# Patient Record
Sex: MALE | Race: WHITE | NOT HISPANIC OR LATINO | Employment: OTHER | URBAN - METROPOLITAN AREA
[De-identification: names, ages, dates, MRNs, and addresses within clinical notes are randomized per-mention and may not be internally consistent; named-entity substitution may affect disease eponyms.]

---

## 2018-04-05 ENCOUNTER — TRANSCRIBE ORDERS (OUTPATIENT)
Dept: ADMINISTRATIVE | Facility: HOSPITAL | Age: 53
End: 2018-04-05

## 2018-04-05 DIAGNOSIS — R60.0 EDEMA, LOWER EXTREMITY: ICD-10-CM

## 2018-04-05 DIAGNOSIS — R20.9 COLD FEET: Primary | ICD-10-CM

## 2018-04-11 ENCOUNTER — HOSPITAL ENCOUNTER (OUTPATIENT)
Dept: CARDIOLOGY | Facility: HOSPITAL | Age: 53
Discharge: HOME | End: 2018-04-11
Attending: FAMILY MEDICINE | Admitting: FAMILY MEDICINE

## 2018-04-11 DIAGNOSIS — R20.9 COLD FEET: ICD-10-CM

## 2018-04-11 DIAGNOSIS — R60.0 EDEMA, LOWER EXTREMITY: ICD-10-CM

## 2018-04-11 LAB
BH CV LOWER ARTERIAL LEFT ABI RATIO: 1.1
BH CV LOWER ARTERIAL LEFT DORSALIS PEDIS SYS MAX: 171 MMHG
BH CV LOWER ARTERIAL LEFT GREAT TOE SYS MAX: 156 MMHG
BH CV LOWER ARTERIAL LEFT POST EX ABI RATIO: 1.3
BH CV LOWER ARTERIAL LEFT POST TIBIAL SYS MAX: 168 MMHG
BH CV LOWER ARTERIAL LEFT TBI RATIO: 1
BH CV LOWER ARTERIAL RIGHT ABI RATIO: 1
BH CV LOWER ARTERIAL RIGHT DORSALIS PEDIS SYS MAX: 152 MMHG
BH CV LOWER ARTERIAL RIGHT GREAT TOE SYS MAX: 155 MMHG
BH CV LOWER ARTERIAL RIGHT POST EX ABI RATIO: 1.3
BH CV LOWER ARTERIAL RIGHT POST TIBIAL SYS MAX: 163 MMHG
BH CV LOWER ARTERIAL RIGHT TBI RATIO: 1
UPPER ARTERIAL LEFT ARM BRACHIAL SYS MAX: 149 MMHG
UPPER ARTERIAL RIGHT ARM BRACHIAL SYS MAX: 152 MMHG

## 2018-04-11 PROCEDURE — 93924 LWR XTR VASC STDY BILAT: CPT

## 2018-04-25 ENCOUNTER — TRANSCRIBE ORDERS (OUTPATIENT)
Dept: ADMINISTRATIVE | Facility: HOSPITAL | Age: 53
End: 2018-04-25

## 2018-04-25 DIAGNOSIS — R20.9 COLD FEET: ICD-10-CM

## 2018-04-25 DIAGNOSIS — R60.9 EDEMA, UNSPECIFIED TYPE: Primary | ICD-10-CM

## 2018-05-08 ENCOUNTER — HOSPITAL ENCOUNTER (OUTPATIENT)
Dept: INFUSION THERAPY | Facility: HOSPITAL | Age: 53
Discharge: HOME | End: 2018-05-08
Attending: FAMILY MEDICINE | Admitting: PSYCHIATRY & NEUROLOGY

## 2018-05-08 DIAGNOSIS — R20.9 COLD FEET: ICD-10-CM

## 2018-05-08 DIAGNOSIS — R60.9 EDEMA, UNSPECIFIED TYPE: ICD-10-CM

## 2018-05-08 PROCEDURE — 95908 NRV CNDJ TST 3-4 STUDIES: CPT | Performed by: PSYCHIATRY & NEUROLOGY

## 2018-05-08 PROCEDURE — 95886 MUSC TEST DONE W/N TEST COMP: CPT

## 2018-05-08 PROCEDURE — 95908 NRV CNDJ TST 3-4 STUDIES: CPT

## 2018-05-08 PROCEDURE — 95886 MUSC TEST DONE W/N TEST COMP: CPT | Performed by: PSYCHIATRY & NEUROLOGY

## 2018-05-08 NOTE — PROCEDURES
EMG REPORT    Indication: Pain and numbness of both lower extremities    Findings: Left peroneal motor study showed no reliable response with either proximal or distal stimulation.  Bilateral tibial motor study showed no reliable response.    Concentric needle EMG of bilateral vastus lateralis, vastus medialis, anterior tibialis, peroneus, gastrocnemius muscles showed no abnormality.    Impression: Nerve conduction studies showed no reliable response.  This is most consistent with this severe diffuse polyneuropathy.  Needle EMG failed to show evidence of superimposed lumbosacral radiculopathy    Thank you for sending this patient for further evaluation.  Wong Horan M.D.

## 2018-08-07 ENCOUNTER — OFFICE VISIT (OUTPATIENT)
Dept: NEUROLOGY | Facility: CLINIC | Age: 53
End: 2018-08-07

## 2018-08-07 ENCOUNTER — LAB (OUTPATIENT)
Dept: LAB | Facility: HOSPITAL | Age: 53
End: 2018-08-07

## 2018-08-07 ENCOUNTER — HOSPITAL ENCOUNTER (OUTPATIENT)
Dept: CARDIOLOGY | Facility: HOSPITAL | Age: 53
Discharge: HOME | End: 2018-08-07
Attending: PSYCHIATRY & NEUROLOGY | Admitting: PSYCHIATRY & NEUROLOGY

## 2018-08-07 VITALS
WEIGHT: 232 LBS | OXYGEN SATURATION: 96 % | SYSTOLIC BLOOD PRESSURE: 140 MMHG | BODY MASS INDEX: 33.21 KG/M2 | HEART RATE: 92 BPM | HEIGHT: 70 IN | DIASTOLIC BLOOD PRESSURE: 80 MMHG

## 2018-08-07 DIAGNOSIS — R60.0 EDEMA OF BOTH LEGS: ICD-10-CM

## 2018-08-07 DIAGNOSIS — G60.9 IDIOPATHIC PERIPHERAL NEUROPATHY: Primary | ICD-10-CM

## 2018-08-07 DIAGNOSIS — R60.0 EDEMA OF BOTH LEGS: Primary | ICD-10-CM

## 2018-08-07 DIAGNOSIS — M48.062 SPINAL STENOSIS OF LUMBAR REGION WITH NEUROGENIC CLAUDICATION: ICD-10-CM

## 2018-08-07 DIAGNOSIS — G60.9 IDIOPATHIC PERIPHERAL NEUROPATHY: ICD-10-CM

## 2018-08-07 PROBLEM — M79.2 NEUROPATHIC PAIN OF FOOT, LEFT: Status: ACTIVE | Noted: 2018-05-03

## 2018-08-07 PROBLEM — M96.1 FAILED BACK SYNDROME OF LUMBAR SPINE: Status: ACTIVE | Noted: 2018-08-07

## 2018-08-07 PROBLEM — IMO0002 UNCONTROLLED TYPE 2 DIABETES MELLITUS WITH COMPLICATION: Status: ACTIVE | Noted: 2018-05-03

## 2018-08-07 PROBLEM — M51.06 LUMBAR DISC DISORDER WITH MYELOPATHY: Status: ACTIVE | Noted: 2018-08-07

## 2018-08-07 PROBLEM — M54.40 LOW BACK PAIN WITH SCIATICA: Status: ACTIVE | Noted: 2018-03-30

## 2018-08-07 PROBLEM — I10 ESSENTIAL HYPERTENSION: Status: ACTIVE | Noted: 2018-05-03

## 2018-08-07 PROBLEM — M16.10 HIP ARTHRITIS: Status: ACTIVE | Noted: 2018-04-06

## 2018-08-07 LAB
BH CV LOWER VASCULAR LEFT COMMON FEMORAL AUGMENT: NORMAL
BH CV LOWER VASCULAR LEFT COMMON FEMORAL COMPETENT: NORMAL
BH CV LOWER VASCULAR LEFT COMMON FEMORAL COMPRESS: NORMAL
BH CV LOWER VASCULAR LEFT COMMON FEMORAL PHASIC: NORMAL
BH CV LOWER VASCULAR LEFT COMMON FEMORAL SPONT: NORMAL
BH CV LOWER VASCULAR LEFT DISTAL FEMORAL COMPRESS: NORMAL
BH CV LOWER VASCULAR LEFT GASTRONEMIUS COMPRESS: NORMAL
BH CV LOWER VASCULAR LEFT GREATER SAPH AK COMPRESS: NORMAL
BH CV LOWER VASCULAR LEFT GREATER SAPH BK COMPRESS: NORMAL
BH CV LOWER VASCULAR LEFT MID FEMORAL AUGMENT: NORMAL
BH CV LOWER VASCULAR LEFT MID FEMORAL COMPETENT: NORMAL
BH CV LOWER VASCULAR LEFT MID FEMORAL COMPRESS: NORMAL
BH CV LOWER VASCULAR LEFT MID FEMORAL PHASIC: NORMAL
BH CV LOWER VASCULAR LEFT MID FEMORAL SPONT: NORMAL
BH CV LOWER VASCULAR LEFT PERONEAL COMPRESS: NORMAL
BH CV LOWER VASCULAR LEFT POPLITEAL AUGMENT: NORMAL
BH CV LOWER VASCULAR LEFT POPLITEAL COMPETENT: NORMAL
BH CV LOWER VASCULAR LEFT POPLITEAL COMPRESS: NORMAL
BH CV LOWER VASCULAR LEFT POPLITEAL PHASIC: NORMAL
BH CV LOWER VASCULAR LEFT POPLITEAL SPONT: NORMAL
BH CV LOWER VASCULAR LEFT POSTERIOR TIBIAL COMPRESS: NORMAL
BH CV LOWER VASCULAR LEFT PROXIMAL FEMORAL COMPRESS: NORMAL
BH CV LOWER VASCULAR LEFT SAPHENOFEMORAL JUNCTION AUGMENT: NORMAL
BH CV LOWER VASCULAR LEFT SAPHENOFEMORAL JUNCTION COMPETENT: NORMAL
BH CV LOWER VASCULAR LEFT SAPHENOFEMORAL JUNCTION COMPRESS: NORMAL
BH CV LOWER VASCULAR LEFT SAPHENOFEMORAL JUNCTION PHASIC: NORMAL
BH CV LOWER VASCULAR LEFT SAPHENOFEMORAL JUNCTION SPONT: NORMAL
BH CV LOWER VASCULAR RIGHT COMMON FEMORAL AUGMENT: NORMAL
BH CV LOWER VASCULAR RIGHT COMMON FEMORAL COMPETENT: NORMAL
BH CV LOWER VASCULAR RIGHT COMMON FEMORAL COMPRESS: NORMAL
BH CV LOWER VASCULAR RIGHT COMMON FEMORAL PHASIC: NORMAL
BH CV LOWER VASCULAR RIGHT COMMON FEMORAL SPONT: NORMAL
BH CV LOWER VASCULAR RIGHT DISTAL FEMORAL COMPRESS: NORMAL
BH CV LOWER VASCULAR RIGHT GASTRONEMIUS COMPRESS: NORMAL
BH CV LOWER VASCULAR RIGHT GREATER SAPH AK COMPRESS: NORMAL
BH CV LOWER VASCULAR RIGHT GREATER SAPH BK COMPRESS: NORMAL
BH CV LOWER VASCULAR RIGHT MID FEMORAL AUGMENT: NORMAL
BH CV LOWER VASCULAR RIGHT MID FEMORAL COMPETENT: NORMAL
BH CV LOWER VASCULAR RIGHT MID FEMORAL COMPRESS: NORMAL
BH CV LOWER VASCULAR RIGHT MID FEMORAL PHASIC: NORMAL
BH CV LOWER VASCULAR RIGHT MID FEMORAL SPONT: NORMAL
BH CV LOWER VASCULAR RIGHT PERONEAL COMPRESS: NORMAL
BH CV LOWER VASCULAR RIGHT POPLITEAL AUGMENT: NORMAL
BH CV LOWER VASCULAR RIGHT POPLITEAL COMPETENT: NORMAL
BH CV LOWER VASCULAR RIGHT POPLITEAL COMPRESS: NORMAL
BH CV LOWER VASCULAR RIGHT POPLITEAL PHASIC: NORMAL
BH CV LOWER VASCULAR RIGHT POPLITEAL SPONT: NORMAL
BH CV LOWER VASCULAR RIGHT POSTERIOR TIBIAL COMPRESS: NORMAL
BH CV LOWER VASCULAR RIGHT PROXIMAL FEMORAL COMPRESS: NORMAL
BH CV LOWER VASCULAR RIGHT SAPHENOFEMORAL JUNCTION AUGMENT: NORMAL
BH CV LOWER VASCULAR RIGHT SAPHENOFEMORAL JUNCTION COMPETENT: NORMAL
BH CV LOWER VASCULAR RIGHT SAPHENOFEMORAL JUNCTION COMPRESS: NORMAL
BH CV LOWER VASCULAR RIGHT SAPHENOFEMORAL JUNCTION PHASIC: NORMAL
BH CV LOWER VASCULAR RIGHT SAPHENOFEMORAL JUNCTION SPONT: NORMAL
ERYTHROCYTE [SEDIMENTATION RATE] IN BLOOD: 44 MM/HR (ref 0–20)
FOLATE SERPL-MCNC: 11.1 NG/ML (ref 4.78–24.2)
T4 FREE SERPL-MCNC: 1.25 NG/DL (ref 0.93–1.7)
TSH SERPL DL<=0.05 MIU/L-ACNC: 1.65 MIU/ML (ref 0.27–4.2)
VIT B12 BLD-MCNC: 305 PG/ML (ref 211–946)

## 2018-08-07 PROCEDURE — 86334 IMMUNOFIX E-PHORESIS SERUM: CPT

## 2018-08-07 PROCEDURE — 86592 SYPHILIS TEST NON-TREP QUAL: CPT | Performed by: PSYCHIATRY & NEUROLOGY

## 2018-08-07 PROCEDURE — 85652 RBC SED RATE AUTOMATED: CPT | Performed by: PSYCHIATRY & NEUROLOGY

## 2018-08-07 PROCEDURE — 84439 ASSAY OF FREE THYROXINE: CPT | Performed by: PSYCHIATRY & NEUROLOGY

## 2018-08-07 PROCEDURE — 82175 ASSAY OF ARSENIC: CPT | Performed by: PSYCHIATRY & NEUROLOGY

## 2018-08-07 PROCEDURE — 36415 COLL VENOUS BLD VENIPUNCTURE: CPT | Performed by: PSYCHIATRY & NEUROLOGY

## 2018-08-07 PROCEDURE — 84155 ASSAY OF PROTEIN SERUM: CPT | Performed by: PSYCHIATRY & NEUROLOGY

## 2018-08-07 PROCEDURE — 83655 ASSAY OF LEAD: CPT | Performed by: PSYCHIATRY & NEUROLOGY

## 2018-08-07 PROCEDURE — 82607 VITAMIN B-12: CPT | Performed by: PSYCHIATRY & NEUROLOGY

## 2018-08-07 PROCEDURE — 82746 ASSAY OF FOLIC ACID SERUM: CPT | Performed by: PSYCHIATRY & NEUROLOGY

## 2018-08-07 PROCEDURE — 93970 EXTREMITY STUDY: CPT

## 2018-08-07 PROCEDURE — 84165 PROTEIN E-PHORESIS SERUM: CPT | Performed by: PSYCHIATRY & NEUROLOGY

## 2018-08-07 PROCEDURE — 99215 OFFICE O/P EST HI 40 MIN: CPT | Performed by: PSYCHIATRY & NEUROLOGY

## 2018-08-07 PROCEDURE — 83825 ASSAY OF MERCURY: CPT | Performed by: PSYCHIATRY & NEUROLOGY

## 2018-08-07 PROCEDURE — 84443 ASSAY THYROID STIM HORMONE: CPT | Performed by: PSYCHIATRY & NEUROLOGY

## 2018-08-07 PROCEDURE — 82784 ASSAY IGA/IGD/IGG/IGM EACH: CPT

## 2018-08-07 PROCEDURE — 82595 ASSAY OF CRYOGLOBULIN: CPT

## 2018-08-07 RX ORDER — CYCLOBENZAPRINE HCL 10 MG
10 TABLET ORAL
COMMUNITY
Start: 2013-08-12

## 2018-08-07 RX ORDER — SILDENAFIL 100 MG/1
TABLET, FILM COATED ORAL
COMMUNITY
Start: 2017-12-26 | End: 2021-01-20

## 2018-08-07 RX ORDER — INSULIN GLARGINE 100 [IU]/ML
3 INJECTION, SOLUTION SUBCUTANEOUS DAILY
Refills: 11 | COMMUNITY
Start: 2018-05-03

## 2018-08-07 RX ORDER — PEN NEEDLE, DIABETIC 31 GX5/16"
NEEDLE, DISPOSABLE MISCELLANEOUS
Refills: 11 | COMMUNITY
Start: 2018-05-03

## 2018-08-07 RX ORDER — GLIMEPIRIDE 4 MG/1
8 TABLET ORAL
COMMUNITY
Start: 2018-06-28 | End: 2019-06-28

## 2018-08-07 RX ORDER — LANCETS
EACH MISCELLANEOUS
COMMUNITY
Start: 2018-06-29

## 2018-08-07 RX ORDER — LISINOPRIL 5 MG/1
5 TABLET ORAL DAILY
Refills: 0 | COMMUNITY
Start: 2018-07-18

## 2018-08-07 RX ORDER — METFORMIN HYDROCHLORIDE 500 MG/1
1000 TABLET, EXTENDED RELEASE ORAL
COMMUNITY
Start: 2018-06-28

## 2018-08-07 RX ORDER — MELOXICAM 15 MG/1
TABLET ORAL
COMMUNITY
Start: 2018-01-23

## 2018-08-07 RX ORDER — METHADONE HYDROCHLORIDE 10 MG/1
10 TABLET ORAL 3 TIMES DAILY
Refills: 0 | COMMUNITY
Start: 2018-07-27

## 2018-08-07 RX ORDER — LIDOCAINE 50 MG/G
1 PATCH TOPICAL
COMMUNITY
Start: 2018-01-23

## 2018-08-07 RX ORDER — ZOLPIDEM TARTRATE 12.5 MG/1
12.5 TABLET, FILM COATED, EXTENDED RELEASE ORAL NIGHTLY
Refills: 5 | COMMUNITY
Start: 2018-07-18

## 2018-08-07 RX ORDER — GABAPENTIN 600 MG/1
600 TABLET ORAL 4 TIMES DAILY
Refills: 2 | COMMUNITY
Start: 2018-07-18

## 2018-08-07 RX ORDER — SERTRALINE HYDROCHLORIDE 100 MG/1
100 TABLET, FILM COATED ORAL DAILY
COMMUNITY
Start: 2013-08-12 | End: 2018-08-30 | Stop reason: SDUPTHER

## 2018-08-07 RX ORDER — SILODOSIN 8 MG/1
8 CAPSULE ORAL NIGHTLY
Refills: 2 | COMMUNITY
Start: 2018-07-30

## 2018-08-07 RX ORDER — DOCUSATE SODIUM 100 MG/1
100 CAPSULE, LIQUID FILLED ORAL 2 TIMES DAILY
COMMUNITY
Start: 2018-01-23

## 2018-08-07 NOTE — PROGRESS NOTES
CC: Numbness and pain left much greater than right lower extremity    HPI:  TONY Kruse is a  53 y.o.  right-handed white male who was sent for a neurologic consultation by Dr. Kendall Stockton with history of diabetes for about 13 years untreated until last year.  He has numbness and pain in the legs left much worse than right.  He describes the pain is being in the lower leg and foot and it is of burning stabbing and throbbing quality.  He states it is excruciating.  He has hypersensitivity of the foot and leg in that just the covers while be very painful.  In the past week he has developed notable swelling in the left leg but there has been apparently some swelling over a few weeks in both legs.  He was placed on gabapentin between 6 and 8 months ago up to 4 tablets daily without much effect he states.  He had been seen by pain management Dr. Sanchez now Dr. Matthew Pickard.  He is on a lidocaine 5% patch, meloxicam and gabapentin.  He takes to prior mate for seizures also and cyclobenzaprine for muscle spasms.  He is on sertraline for depression.    He has history of an extensive lumbar decompression in 2004 after a fall with multiple spinal fractures.  He had partial paralysis from the waist down acutely.  He has been reevaluated and the L5/S1 level which was not confused now has significant spondylolisthesis with cauda equina compression while weightbearing.  He also has severe osteoarthritis in both hips.  He states that he has been told by surgeons that they do not wish to operate on him unless his hemoglobin A1c drops to 7.    He states with this fall he had seizures.  He was seen before by Dr. Rivera who used a tuning fork to trigger a seizure.  He has not had recurrent seizures being on Topamax.    His initial hemoglobin A1c was 12.  Being treated for a few months it is now 9.    An EMG was performed by Dr. Horan showing absent motor conductions in the left peroneal and both tibial motor nerves with  negative needle exam in both legs.  No foot muscles were sampled.    Past Medical History:   Diagnosis Date   • Anxiety    • Degenerative joint disease (DJD) of lumbar spine    • Depression    • Diabetes mellitus (CMS/HCC)    • Seizures (CMS/HCC)          Past Surgical History:   Procedure Laterality Date   • BACK SURGERY             Current Outpatient Prescriptions:   •  cyclobenzaprine (FLEXERIL) 10 MG tablet, Take 1 tablet by mouth 2 (two) times a day., Disp: , Rfl:   •  cyclobenzaprine (FLEXERIL) 10 MG tablet, Take 10 mg by mouth., Disp: , Rfl:   •  docusate sodium (COLACE) 100 MG capsule, Take 1 capsule by mouth daily., Disp: , Rfl:   •  docusate sodium (DOK) 100 MG capsule, Take 100 mg by mouth 2 (Two) Times a Day., Disp: , Rfl:   •  glimepiride (AMARYL) 4 MG tablet, Take 8 mg by mouth., Disp: , Rfl:   •  glucose blood test strip, 1 strip by Other route., Disp: , Rfl:   •  lidocaine (LIDODERM) 5 %, Place 1 patch on the skin as directed by provider., Disp: , Rfl:   •  lisinopril (PRINIVIL,ZESTRIL) 5 MG tablet, Take 5 mg by mouth Daily., Disp: , Rfl: 0  •  meloxicam (MOBIC) 15 MG tablet, TK 1 T PO D, Disp: , Rfl:   •  metFORMIN ER (GLUCOPHAGE-XR) 500 MG 24 hr tablet, Take 1,000 mg by mouth., Disp: , Rfl:   •  mupirocin (BACTROBAN) 2 % ointment, Using Q-tips apply a small amount into each nostril BID x 5 day.  Start 5 days prior to surgery., Disp: , Rfl:   •  sertraline (ZOLOFT) 100 MG tablet, Take 2 tablets by mouth daily., Disp: , Rfl:   •  sertraline (ZOLOFT) 100 MG tablet, Take 100 mg by mouth Daily., Disp: , Rfl:   •  sildenafil (VIAGRA) 100 MG tablet, TK 1 T PO HS, Disp: , Rfl:   •  topiramate (TOPAMAX) 100 MG tablet, Take 1 tablet by mouth 2 (two) times a day., Disp: , Rfl:   •  B-D ULTRAFINE III SHORT PEN 31G X 8 MM misc, U UTD, Disp: , Rfl: 11  •  gabapentin (NEURONTIN) 600 MG tablet, Take 600 mg by mouth 4 (Four) Times a Day., Disp: , Rfl: 2  •  LANTUS SOLOSTAR 100 UNIT/ML injection pen, Inject 3 mL  under the skin into the appropriate area as directed Daily., Disp: , Rfl: 11  •  methadone (DOLOPHINE) 10 MG tablet, Take 10 mg by mouth 3 (Three) Times a Day,, Disp: , Rfl: 0  •  RAPAFLO 8 MG capsule capsule, Take 8 mg by mouth Every Night., Disp: , Rfl: 2  •  zolpidem CR (AMBIEN CR) 12.5 MG CR tablet, Take 12.5 mg by mouth Every Night., Disp: , Rfl: 5      History reviewed. No pertinent family history.      Social History     Social History   • Marital status:      Spouse name: N/A   • Number of children: N/A   • Years of education: N/A     Occupational History   • Not on file.     Social History Main Topics   • Smoking status: Never Smoker   • Smokeless tobacco: Not on file   • Alcohol use No   • Drug use: Unknown   • Sexual activity: Not on file     Other Topics Concern   • Not on file     Social History Narrative   • No narrative on file         Allergies   Allergen Reactions   • Codeine Itching         Pain Scale: 8+/10        ROS:  Review of Systems   Constitutional: Negative for activity change, appetite change and fatigue.   HENT: Negative for ear pain, facial swelling and hearing loss.    Eyes: Negative for pain, redness and itching.   Cardiovascular: Negative for chest pain and leg swelling.   Gastrointestinal: Negative for abdominal pain, nausea and vomiting.   Endocrine: Negative for cold intolerance and heat intolerance.   Skin: Negative for color change, pallor, rash and wound.   Allergic/Immunologic: Negative for environmental allergies and food allergies.   Neurological: Positive for numbness. Negative for dizziness, tremors, seizures, syncope, facial asymmetry, speech difficulty, weakness, light-headedness and headaches.   Hematological: Negative for adenopathy. Does not bruise/bleed easily.   Psychiatric/Behavioral: Positive for confusion, decreased concentration and sleep disturbance. Negative for agitation, behavioral problems, dysphoric mood, hallucinations, self-injury and suicidal  "ideas. The patient is nervous/anxious. The patient is not hyperactive.            Physical Exam:  Vitals:    08/07/18 0902   BP: 140/80   Pulse: 92   SpO2: 96%   Weight: 105 kg (232 lb)   Height: 177.8 cm (70\")     Body mass index is 33.29 kg/m².    Physical Exam  Gen.: Obese white male no acute distress.  HEENT: Normocephalic no evidence of trauma.  Discs flat.  No A-V nicking.  Throat negative.  Neck: Supple.  No thyromegaly.  No cervical bruits.  Radial pulses were strong and simultaneous.  Heart: Regular rate and rhythm without murmurs  Lower extremities show some degree of pedal edema in the right but moderate edema on the left and there is an obvious difference in size of the calves.      Neurological Exam:   Mental status: Awake alert oriented and conversant without evidence of an affective disorder thought disorder delusions or hallucinations.  HCF: No aphasia or apraxia or dysarthria.  Recent and remote memory intact.  Knowledge of recent events intact.  Cranial nerves: 2-12 intact  Motor: Normal tone and bulk.  There is intrinsic hand weakness bilaterally with good power proximally.  In the lower extremities there is weakness of the tibialis anterior and toe extension and flexion.  There is some pain inhibition of psoas muscles most likely but I can't be entirely certain if a partial organic component is present.  Reflexes: Biceps jerks +1 rest are negative.  Downgoing toe signs  Sensory: Diffusely diminished light touch and pinprick arms and legs.  Vibration is absent the ankles and reduced at the knees.  Position sense is intact in the toes  Cerebellar: Finger to nose rapid movements are intact  Gait and Station: Antalgic, broad-based        Results:      No results found for: GLUCOSE, BUN, CREATININE, EGFRIFNONA, EGFRIFAFRI, BCR, CO2, CALCIUM, PROTENTOTREF, ALBUMIN, LABIL2, AST, ALT    No results found for: WBC, HGB, HCT, MCV, PLT      .No results found for: RPR      Lab Results   Component Value Date "    TSH 1.650 08/07/2018         Lab Results   Component Value Date    [id number] 305 08/07/2018         Lab Results   Component Value Date    FOLATE 11.10 08/07/2018         No results found for: HGBA1C    Myelogram/CT report reviewed from [facility] showing multiple lumbar fusions except for L 5/S1 showing significant shift with weightbearing  EMG of Dr. Horan's reviewed including data sheets  Reviewed lower extremity arterial study showing no evidence of significant large vessel vascular disease in the legs      Assessment:   1.  Severe diabetic polyneuropathy  2.  Multilevel lumbar degenerative disc disease status post fall with fracture and paraparesis status post decompression with spondylolisthesis at L5/S1 and neurogenic claudication  3.  Pedal edema much more on the left-rule out DVT  4.  Severe bilateral osteoarthritis of the hips    Plan:  1.  Labs for treatable causes.  Check sedimentation rate, RPR, B12 and folate, thyroid functions, heavy metals, copper level, SPEP and IEP, cryoglobulins  2.  Continue efforts to reduce hemoglobin A1c to 7.  3.  Agree with escalating dose of gabapentin which may continue on a weekly basis until he is on 1800 mg daily.  He has notable pedal edema may be at least partially due to the gabapentin and it may prevent escalation to higher doses.  4.  Bilateral lower extremity venous Doppler today to exclude DVT  5.  Follow-up in about 4 weeks with Gracia Rivera NP        Addendum:  Phone call preliminary venous Doppler both legs negative for DVT    Gns              At least 20 minutes of this 40 minute follow-up were spent counseling the patient regarding his neuropathy, spinal stenosis            Dictated utilizing Dragon dictation.

## 2018-08-08 LAB
ALBUMIN SERPL-MCNC: 3.8 G/DL (ref 2.9–4.4)
ALBUMIN/GLOB SERPL: 1.2 {RATIO} (ref 0.7–1.7)
ALPHA1 GLOB FLD ELPH-MCNC: 0.3 G/DL (ref 0–0.4)
ALPHA2 GLOB SERPL ELPH-MCNC: 1 G/DL (ref 0.4–1)
B-GLOBULIN SERPL ELPH-MCNC: 1.3 G/DL (ref 0.7–1.3)
GAMMA GLOB SERPL ELPH-MCNC: 0.8 G/DL (ref 0.4–1.8)
GLOBULIN SER CALC-MCNC: 3.3 G/DL (ref 2.2–3.9)
IGA SERPL-MCNC: 237 MG/DL (ref 90–386)
IGG SERPL-MCNC: 799 MG/DL (ref 700–1600)
IGM SERPL-MCNC: 80 MG/DL (ref 20–172)
Lab: NORMAL
M-SPIKE: NORMAL G/DL
PROT PATTERN SERPL ELPH-IMP: NORMAL
PROT PATTERN SERPL IFE-IMP: NORMAL
PROT SERPL-MCNC: 7.1 G/DL (ref 6–8.5)

## 2018-08-09 LAB
ARSENIC BLD-MCNC: 5 UG/L (ref 2–23)
LEAD BLD-MCNC: NORMAL UG/DL (ref 0–4)
MERCURY BLD-MCNC: NORMAL UG/L (ref 0–14.9)
RPR SER QL: NORMAL

## 2018-08-13 LAB — CRYOGLOB SER QL 1D COLD INC: NORMAL

## 2018-08-30 ENCOUNTER — OFFICE VISIT (OUTPATIENT)
Dept: NEUROLOGY | Facility: CLINIC | Age: 53
End: 2018-08-30

## 2018-08-30 VITALS
DIASTOLIC BLOOD PRESSURE: 78 MMHG | BODY MASS INDEX: 33.21 KG/M2 | WEIGHT: 232 LBS | HEIGHT: 70 IN | HEART RATE: 87 BPM | SYSTOLIC BLOOD PRESSURE: 148 MMHG | OXYGEN SATURATION: 94 %

## 2018-08-30 DIAGNOSIS — G62.89 DISEASE RELATED PERIPHERAL NEUROPATHY: Primary | ICD-10-CM

## 2018-08-30 PROBLEM — M79.2 NEUROPATHIC PAIN OF FOOT, LEFT: Status: RESOLVED | Noted: 2018-05-03 | Resolved: 2018-08-30

## 2018-08-30 PROCEDURE — 99213 OFFICE O/P EST LOW 20 MIN: CPT | Performed by: NURSE PRACTITIONER

## 2018-08-30 RX ORDER — PREGABALIN 50 MG/1
50 CAPSULE ORAL 3 TIMES DAILY
Qty: 90 CAPSULE | Refills: 2 | Status: SHIPPED | OUTPATIENT
Start: 2018-08-30 | End: 2018-08-30 | Stop reason: SDUPTHER

## 2018-08-30 NOTE — PROGRESS NOTES
CC: Peripheral neuropathy    HPI:  TONY Kruse is a  53 y.o. RH-handed male with PMH DM, seizure, fall resulting in multiple spinal fractures s/p extensive lumbar decompression 2004, depression, and muscle spasms who presents today for f/u of neuropathy.     After his fall in 2004 he had partial paralysis from the waist down acutely.  He has been reevaluated and the L5/S1 level which was not fused now has significant spondylolisthesis with cauda equina compression while weightbearing.  He also has severe osteoarthritis in both hips.    The patient is on Topamax for seizure (previously documented as non-epileptic by Dr Rivera as seizure was provoked by tuning fork) and spasms. The patient sees pain management, Dr Matthew Pickard and is on lidocaine patches, gabapentin, methadone, and meloxicam. His DM was previously untreated for 13 years until last year. His initial hgb A1c was 12% and at the beginning of this month it was down to 8.1%. He states he needs left hip surgery and then lumbar fusion and his A1c must be below 7% prior to surgery.    When the patient saw Dr Kowalski he was complaining of numbness/pain in the legs, worse on the left. It is a throbbing and burning quality. An EMG was performed by Dr. Horan showing absent motor conductions in the left peroneal and both tibial motor nerves with negative needle exam in both legs.  No foot muscles were sampled. Labs for treatable causes of PN were unremarkable: B12 303, folate 11, no cryoglobulins detected, sed rate 44, RPR non reactive, TSH 1.6, FT4 1.25, heavy metal screen negative, and no MGUS on SPEP and IEP.    According to the patient prior to his visit with Dr Kowalski he was on gabapentin 600 mg TID, and Dr Kowalski added an additional dose making the total daily dose 2400 mg. He has not noticed any significant improvement, but also denies any side effects.         Past Medical History:   Diagnosis Date   • Anxiety    • Degenerative joint disease (DJD) of  lumbar spine    • Depression    • Diabetes mellitus (CMS/HCC)    • Seizures (CMS/HCC)          Past Surgical History:   Procedure Laterality Date   • BACK SURGERY             Current Outpatient Prescriptions:   •  ACCU-CHEK FASTCLIX LANCETS misc, , Disp: , Rfl:   •  B-D ULTRAFINE III SHORT PEN 31G X 8 MM misc, U UTD, Disp: , Rfl: 11  •  cyclobenzaprine (FLEXERIL) 10 MG tablet, Take 1 tablet by mouth 2 (two) times a day., Disp: , Rfl:   •  cyclobenzaprine (FLEXERIL) 10 MG tablet, Take 10 mg by mouth., Disp: , Rfl:   •  docusate sodium (COLACE) 100 MG capsule, Take 1 capsule by mouth daily., Disp: , Rfl:   •  docusate sodium (DOK) 100 MG capsule, Take 100 mg by mouth 2 (Two) Times a Day., Disp: , Rfl:   •  gabapentin (NEURONTIN) 600 MG tablet, Take 600 mg by mouth 4 (Four) Times a Day., Disp: , Rfl: 2  •  glimepiride (AMARYL) 4 MG tablet, Take 8 mg by mouth., Disp: , Rfl:   •  glucose blood test strip, 1 strip by Other route., Disp: , Rfl:   •  LANTUS SOLOSTAR 100 UNIT/ML injection pen, Inject 3 mL under the skin into the appropriate area as directed Daily., Disp: , Rfl: 11  •  lidocaine (LIDODERM) 5 %, Place 1 patch on the skin as directed by provider., Disp: , Rfl:   •  lisinopril (PRINIVIL,ZESTRIL) 5 MG tablet, Take 5 mg by mouth Daily., Disp: , Rfl: 0  •  meloxicam (MOBIC) 15 MG tablet, TK 1 T PO D, Disp: , Rfl:   •  metFORMIN ER (GLUCOPHAGE-XR) 500 MG 24 hr tablet, Take 1,000 mg by mouth., Disp: , Rfl:   •  methadone (DOLOPHINE) 10 MG tablet, Take 10 mg by mouth 3 (Three) Times a Day,, Disp: , Rfl: 0  •  RAPAFLO 8 MG capsule capsule, Take 8 mg by mouth Every Night., Disp: , Rfl: 2  •  sertraline (ZOLOFT) 100 MG tablet, Take 2 tablets by mouth daily., Disp: , Rfl:   •  sildenafil (VIAGRA) 100 MG tablet, TK 1 T PO HS, Disp: , Rfl:   •  topiramate (TOPAMAX) 100 MG tablet, Take 1 tablet by mouth 2 (two) times a day., Disp: , Rfl:   •  TWINRIX 720-20 injection, , Disp: , Rfl:   •  zolpidem CR (AMBIEN CR) 12.5 MG CR  tablet, Take 12.5 mg by mouth Every Night., Disp: , Rfl: 5  •  mupirocin (BACTROBAN) 2 % ointment, Using Q-tips apply a small amount into each nostril BID x 5 day.  Start 5 days prior to surgery., Disp: , Rfl:       History reviewed. No pertinent family history.      Social History     Social History   • Marital status:      Spouse name: N/A   • Number of children: N/A   • Years of education: N/A     Occupational History   • Not on file.     Social History Main Topics   • Smoking status: Never Smoker   • Smokeless tobacco: Never Used   • Alcohol use No   • Drug use: Unknown   • Sexual activity: Not on file     Other Topics Concern   • Not on file     Social History Narrative   • No narrative on file         Allergies   Allergen Reactions   • Codeine Itching         Pain Scale: 8 out of 10      ROS:  Review of Systems   Constitutional: Negative for activity change, appetite change, chills, diaphoresis, fatigue, fever and unexpected weight change.   HENT: Negative for drooling, hearing loss, tinnitus, trouble swallowing and voice change.    Eyes: Negative for photophobia, pain and visual disturbance.   Respiratory: Negative for chest tightness and shortness of breath.    Cardiovascular: Positive for leg swelling. Negative for chest pain and palpitations.   Gastrointestinal: Negative for blood in stool, nausea and vomiting.   Endocrine: Negative for cold intolerance and heat intolerance.   Genitourinary: Negative for difficulty urinating.   Musculoskeletal: Positive for gait problem. Negative for neck pain and neck stiffness.   Skin: Negative for rash.   Neurological: Positive for weakness (legs) and light-headedness. Negative for dizziness, tremors, seizures, syncope, facial asymmetry, speech difficulty, numbness and headaches.   Hematological: Bruises/bleeds easily.   Psychiatric/Behavioral: Negative for agitation, behavioral problems, confusion, hallucinations, sleep disturbance and suicidal ideas. The  "patient is not nervous/anxious.            Physical Exam:  Vitals:    08/30/18 0903   BP: 148/78   BP Location: Left arm   Patient Position: Sitting   Cuff Size: Adult   Pulse: 87   SpO2: 94%   Weight: 105 kg (232 lb)   Height: 177.8 cm (70\")     Body mass index is 33.29 kg/m².    Physical Exam  General appearance: Well developed, well nourished, well groomed, alert and cooperative.   HEENT: Normocephalic.   Neck and spine: Normal range of motion. Normal alignment. No mass or tenderness.    Cardiac: Regular rate and rhythm. No murmurs.   Peripheral Vasculature: Extremities warm and dry.  Chest Exam: Clear to auscultation bilaterally, no wheezes, no rhonchi.  Extremities: 1+ BLE edema.  Skin: No rashes or birthmarks.     Neurological Exam:   Higher integrative function: Oriented to time, place, person, intact recent and remote memory, attention span, concentration and language. Spontaneous speech, fund of vocabulary are normal.   CN II: Normal visual fields.   CN III IV VI: Extraocular movements are full without nystagmus. Pupils are equal, round, and reactive to light. No relative afferent pupillary defect. No internuclear ophthalmoplegia.   CN V: Normal facial sensation.  CN VII: Facial movements are symmetric, no weakness.   CN VIII: Auditory acuity is normal.   CN IX & X: Symmetric palatal movement.   CN XI: Sternocleidomastoid and trapezius are normal. No weakness.   CN XII: The tongue is midline. No atrophy or fasciculations.   Motor: Normal muscle strength except bilateral intrinsic hand weakness and left dorsiflexion/extesnion 4/5. No fasciculations, rigidity, spasticity or abnormal movements.   Sensation: Decreased LT BLE. Pinprick absent below knees. Vibration absent in right foot, absent entire left leg up to hip.  Station and gait: Antalgic gait. Uses rolling walker.  Muscle stretch reflexes: Reflexes 1+ in uppers, absent in lowers. Plantar reflexes are flexor bilaterally.   Coordination: Finger to nose " test showed no dysmetria.     Results:      Lab Results   Component Value Date    PROTENTOTREF 7.1 08/07/2018    ALBUMIN 3.8 08/07/2018    LABIL2 1.2 08/07/2018       No results found for: WBC, HGB, HCT, MCV, PLT      .  Lab Results   Component Value Date    RPR Non-Reactive 08/07/2018         Lab Results   Component Value Date    TSH 1.650 08/07/2018         Lab Results   Component Value Date    [id number] 305 08/07/2018         Lab Results   Component Value Date    FOLATE 11.10 08/07/2018         No results found for: HGBA1C     Assessment:   Mr Kruse was seen today for peripheral neuroapathy. Treatable causes of neuropathy labs w/suarez was unrevealing. B12 was borderline low at 303, which would not cause any symptoms. Neuropathy felt related to uncontrolled DM and back injury. Recent increase in gabapentin has not been helpful.    Plan:  Will see if pt gets better relief with Lyrica. Pt given instructions to decrease neurontin and increase lyrica to 50 mg TID. Copay card given.  B12 1000 mcg PO daily, OTC  F/U in 3 months or sooner if needed.

## 2018-08-30 NOTE — PATIENT INSTRUCTIONS
- You are currently taking gabapentin 1 pill 4 times a day  - Decrease gabapentin by 1 pills every couples days as tolerated. Once only taking 2 gabapentin daily start lyrica 50 mg twice a day.   - Once completely off of neurontin add third pill of lyrica so youre taking lyrica 50 mg three times a day.  - let me know if any issues  - follow up in 3 months

## 2018-08-31 ENCOUNTER — TELEPHONE (OUTPATIENT)
Dept: NEUROLOGY | Facility: CLINIC | Age: 53
End: 2018-08-31

## 2018-08-31 RX ORDER — PREGABALIN 50 MG/1
50 CAPSULE ORAL 3 TIMES DAILY
Qty: 90 CAPSULE | Refills: 2 | Status: SHIPPED | OUTPATIENT
Start: 2018-08-31 | End: 2019-08-31

## 2019-12-18 ENCOUNTER — HOSPITAL ENCOUNTER (OUTPATIENT)
Dept: CARDIOLOGY | Facility: HOSPITAL | Age: 54
Discharge: HOME | End: 2019-12-18

## 2019-12-18 ENCOUNTER — OFFICE VISIT (OUTPATIENT)
Dept: CARDIOLOGY | Facility: CLINIC | Age: 54
End: 2019-12-18

## 2019-12-18 VITALS
SYSTOLIC BLOOD PRESSURE: 138 MMHG | BODY MASS INDEX: 35.13 KG/M2 | HEART RATE: 98 BPM | OXYGEN SATURATION: 99 % | DIASTOLIC BLOOD PRESSURE: 80 MMHG | HEIGHT: 70 IN | WEIGHT: 245.4 LBS

## 2019-12-18 DIAGNOSIS — R06.02 EXERTIONAL SHORTNESS OF BREATH: Primary | ICD-10-CM

## 2019-12-18 DIAGNOSIS — E78.5 HYPERLIPIDEMIA LDL GOAL <100: ICD-10-CM

## 2019-12-18 DIAGNOSIS — R06.02 EXERTIONAL SHORTNESS OF BREATH: ICD-10-CM

## 2019-12-18 DIAGNOSIS — I10 ESSENTIAL HYPERTENSION: ICD-10-CM

## 2019-12-18 PROCEDURE — 99204 OFFICE O/P NEW MOD 45 MIN: CPT | Performed by: INTERNAL MEDICINE

## 2019-12-18 PROCEDURE — 93000 ELECTROCARDIOGRAM COMPLETE: CPT | Performed by: INTERNAL MEDICINE

## 2019-12-18 NOTE — PROGRESS NOTES
PATIENTINFORMATION    Date of Office Visit: 2019  Encounter Provider: Danilo Cespedes MD  Place of Service: [facility] CARDIOLOGY  Patient Name: Kenneth Kruse  : 1965    Subjective:     Encounter Date:2019      Patient ID: Kenneth Kruse is a 54 y.o. male.    Chief Complaint   Patient presents with   • Shortness of Breath     HPI  Mr. Kruse is a 54 years old male patient with past medical history of hypertension, hyperlipidemia, diabetes, chronic bilateral lower extremity weakness from lower back problems and cord compression with multiple surgeries was referred to cardiology clinic for evaluation of exertional shortness of breath.  His last surgery was about 3 weeks ago that went well and patient reports improvement in back pain severity.  He has had worsening exertional shortness of breath for the past 6 months and has particularly worsened over the past few weeks.  He is okay as as long as he is resting but any exertion would precipitate shortness of breath and he could barely walk past 40-50 feet without stopping.  He denied associated chest discomfort or heaviness, orthopnea, PND.  He has mild bilateral lower extremity swelling.  He denied any pleuritic type chest pain, new asymmetric leg swelling, cough or fever.  Denied any prior hyperactive airway disease.  He had vasodilator SPECT about a year and a half ago as part of preoperative cardiovascular evaluation at [facility] and it was reported normal study with no evidence for myocardial ischemia.  Echocardiogram done at about the same time also reported only grade 1 diastolic dysfunction and no significant other abnormalities and his left ventricular ejection fraction was normal.  His blood pressure is overall well controlled on current medications.  He could not be started on a statin in the past because of severe myalgia.  At baseline he has bilateral lower extremity weakness and walks slowly using  "a cane.    ROS   All systems reviewed and positive for history of poor wound healing, pain in the legs with walking, joint pain, difficulty urinating, numbness and tingling.  Otherwise negative review of systems.    Past Medical History:   Diagnosis Date   • Anemia    • Anxiety    • Degenerative joint disease (DJD) of lumbar spine    • Depression    • Diabetes mellitus (CMS/HCC)    • POLLOCK (dyspnea on exertion)    • Dyslipidemia    • Edema of extremities    • Hyperlipidemia    • Hypertension    • Insomnia    • Polyneuropathy    • Seizures (CMS/HCC)        Past Surgical History:   Procedure Laterality Date   • BACK SURGERY         Social History     Socioeconomic History   • Marital status:      Spouse name: Not on file   • Number of children: Not on file   • Years of education: Not on file   • Highest education level: Not on file   Tobacco Use   • Smoking status: Never Smoker   • Smokeless tobacco: Never Used   Substance and Sexual Activity   • Alcohol use: No   • Drug use: Not Currently   • Sexual activity: Not Currently       Family History   Problem Relation Age of Onset   • Coronary artery disease Father            ECG 12 Lead  Date/Time: 12/18/2019 2:08 PM  Performed by: Danilo Cespedes MD  Authorized by: Danilo Cespedes MD   Comparison: compared with previous ECG from 10/9/2018  Similar to previous ECG  Rhythm: sinus rhythm  Rate: normal  Conduction: conduction normal  Q waves: III and aVF    ST Segments: ST segments normal  T Waves: T waves normal  QRS axis: normal  Other: no other findings    Clinical impression: abnormal EKG               Objective:     /80 (BP Location: Left arm, Patient Position: Sitting)   Pulse 98   Ht 177.8 cm (70\")   Wt 111 kg (245 lb 6.4 oz)   SpO2 99%   BMI 35.21 kg/m²  Body mass index is 35.21 kg/m².     Physical Exam   Constitutional: He is oriented to person, place, and time. He appears well-developed and well-nourished. No distress.   HENT:   Head: " Normocephalic and atraumatic.   Eyes: Pupils are equal, round, and reactive to light. EOM are normal.   Neck: Normal range of motion. Neck supple. No thyromegaly present.   Cardiovascular: Normal rate, regular rhythm, normal heart sounds and intact distal pulses. Exam reveals no gallop and no friction rub.   No murmur heard.  Pulmonary/Chest: Effort normal and breath sounds normal. No respiratory distress. He has no wheezes. He has no rales. He exhibits no tenderness.   Abdominal: Soft. Bowel sounds are normal. He exhibits no distension. There is no guarding.   Musculoskeletal: Normal range of motion. He exhibits no edema or deformity.   BL LE pitting edema    Neurological: He is alert and oriented to person, place, and time. He has normal reflexes. No cranial nerve deficit.   Bilateral lower extremity power of about 3 out of 5   Skin: Skin is warm and dry. No rash noted. He is not diaphoretic.   Psychiatric: He has a normal mood and affect. Judgment normal.       Review Of Data: I have personally reviewed recent labs, echocardiogram and stress test results from [place].  Results included in HPI      Assessment/Plan:         EKG reviewed and he is borderline tachycardic.  Questionable Q waves on inferior leads    Exertional shortness of breath  -No clinical evidence for PE  -Could be anginal equivalent and patient had multiple risk factor for coronary artery disease including diabetes, hyperlipidemia untreated, hypertension  -We will do stress echocardiogram with dobutamine.  Review rest echo before stress    Hyperlipidemia LDL goal <100  -Repeat lipid panel    Essential hypertension   -Fairly controlled    Type 2 diabetes mellitus     Chronic low back pain and lower extremity weakness   -On narcotic    Diagnosis and plan of care discussed with patient and verbalized understanding.           Danilo Cespedes MD  12/18/19  2:12 PM

## 2020-06-23 ENCOUNTER — TELEPHONE (OUTPATIENT)
Dept: CARDIOLOGY | Facility: CLINIC | Age: 55
End: 2020-06-23

## 2020-11-02 ENCOUNTER — LAB REQUISITION (OUTPATIENT)
Dept: LAB | Facility: HOSPITAL | Age: 55
End: 2020-11-02

## 2020-11-02 DIAGNOSIS — Z00.00 ENCOUNTER FOR GENERAL ADULT MEDICAL EXAMINATION WITHOUT ABNORMAL FINDINGS: ICD-10-CM

## 2020-11-02 PROCEDURE — 88305 TISSUE EXAM BY PATHOLOGIST: CPT | Performed by: OTOLARYNGOLOGY

## 2020-11-03 LAB
CYTO UR: NORMAL
LAB AP CASE REPORT: NORMAL
LAB AP CLINICAL INFORMATION: NORMAL
PATH REPORT.FINAL DX SPEC: NORMAL
PATH REPORT.GROSS SPEC: NORMAL

## 2021-01-20 ENCOUNTER — HOSPITAL ENCOUNTER (OUTPATIENT)
Dept: CARDIOLOGY | Facility: HOSPITAL | Age: 56
Discharge: HOME | End: 2021-01-20
Admitting: INTERNAL MEDICINE

## 2021-01-20 ENCOUNTER — OFFICE VISIT (OUTPATIENT)
Dept: CARDIOLOGY | Facility: CLINIC | Age: 56
End: 2021-01-20

## 2021-01-20 VITALS
HEART RATE: 103 BPM | DIASTOLIC BLOOD PRESSURE: 90 MMHG | BODY MASS INDEX: 36.79 KG/M2 | OXYGEN SATURATION: 98 % | SYSTOLIC BLOOD PRESSURE: 148 MMHG | HEIGHT: 70 IN | WEIGHT: 257 LBS

## 2021-01-20 DIAGNOSIS — I10 ESSENTIAL HYPERTENSION: ICD-10-CM

## 2021-01-20 DIAGNOSIS — R06.02 EXERTIONAL SHORTNESS OF BREATH: ICD-10-CM

## 2021-01-20 DIAGNOSIS — I73.9 PVD (PERIPHERAL VASCULAR DISEASE) WITH CLAUDICATION (HCC): ICD-10-CM

## 2021-01-20 DIAGNOSIS — D50.8 OTHER IRON DEFICIENCY ANEMIA: ICD-10-CM

## 2021-01-20 DIAGNOSIS — I10 ESSENTIAL HYPERTENSION: Primary | ICD-10-CM

## 2021-01-20 LAB
ALBUMIN SERPL-MCNC: 4.3 G/DL (ref 3.5–5.2)
ALBUMIN/GLOB SERPL: 1.4 G/DL
ALP SERPL-CCNC: 109 U/L (ref 39–117)
ALT SERPL W P-5'-P-CCNC: 35 U/L (ref 1–41)
ANION GAP SERPL CALCULATED.3IONS-SCNC: 11.4 MMOL/L (ref 5–15)
AST SERPL-CCNC: 32 U/L (ref 1–40)
BASOPHILS # BLD AUTO: 0.06 10*3/MM3 (ref 0–0.2)
BASOPHILS NFR BLD AUTO: 0.6 % (ref 0–1.5)
BILIRUB SERPL-MCNC: 0.2 MG/DL (ref 0–1.2)
BUN SERPL-MCNC: 11 MG/DL (ref 6–20)
BUN/CREAT SERPL: 11.8 (ref 7–25)
CALCIUM SPEC-SCNC: 9.1 MG/DL (ref 8.6–10.5)
CHLORIDE SERPL-SCNC: 94 MMOL/L (ref 98–107)
CHOLEST SERPL-MCNC: 227 MG/DL (ref 0–200)
CO2 SERPL-SCNC: 26.6 MMOL/L (ref 22–29)
CREAT SERPL-MCNC: 0.93 MG/DL (ref 0.76–1.27)
DEPRECATED RDW RBC AUTO: 43.6 FL (ref 37–54)
EOSINOPHIL # BLD AUTO: 0.28 10*3/MM3 (ref 0–0.4)
EOSINOPHIL NFR BLD AUTO: 3 % (ref 0.3–6.2)
ERYTHROCYTE [DISTWIDTH] IN BLOOD BY AUTOMATED COUNT: 15.1 % (ref 12.3–15.4)
FERRITIN SERPL-MCNC: 91.6 NG/ML (ref 30–400)
GFR SERPL CREATININE-BSD FRML MDRD: 84 ML/MIN/1.73
GLOBULIN UR ELPH-MCNC: 3.1 GM/DL
GLUCOSE SERPL-MCNC: 318 MG/DL (ref 65–99)
HCT VFR BLD AUTO: 40.3 % (ref 37.5–51)
HDLC SERPL-MCNC: 51 MG/DL (ref 40–60)
HGB BLD-MCNC: 12.4 G/DL (ref 13–17.7)
IMM GRANULOCYTES # BLD AUTO: 0.06 10*3/MM3 (ref 0–0.05)
IMM GRANULOCYTES NFR BLD AUTO: 0.6 % (ref 0–0.5)
IRON 24H UR-MRATE: 34 MCG/DL (ref 59–158)
IRON SATN MFR SERPL: 8 % (ref 20–50)
LDLC SERPL CALC-MCNC: 124 MG/DL (ref 0–100)
LDLC/HDLC SERPL: 2.3 {RATIO}
LYMPHOCYTES # BLD AUTO: 1.87 10*3/MM3 (ref 0.7–3.1)
LYMPHOCYTES NFR BLD AUTO: 20.1 % (ref 19.6–45.3)
MCH RBC QN AUTO: 24.8 PG (ref 26.6–33)
MCHC RBC AUTO-ENTMCNC: 30.8 G/DL (ref 31.5–35.7)
MCV RBC AUTO: 80.6 FL (ref 79–97)
MONOCYTES # BLD AUTO: 0.57 10*3/MM3 (ref 0.1–0.9)
MONOCYTES NFR BLD AUTO: 6.1 % (ref 5–12)
NEUTROPHILS NFR BLD AUTO: 6.45 10*3/MM3 (ref 1.7–7)
NEUTROPHILS NFR BLD AUTO: 69.6 % (ref 42.7–76)
NRBC BLD AUTO-RTO: 0 /100 WBC (ref 0–0.2)
NT-PROBNP SERPL-MCNC: 32 PG/ML (ref 0–900)
PLATELET # BLD AUTO: 205 10*3/MM3 (ref 140–450)
PMV BLD AUTO: 10.8 FL (ref 6–12)
POTASSIUM SERPL-SCNC: 4.5 MMOL/L (ref 3.5–5.2)
PROT SERPL-MCNC: 7.4 G/DL (ref 6–8.5)
RBC # BLD AUTO: 5 10*6/MM3 (ref 4.14–5.8)
SODIUM SERPL-SCNC: 132 MMOL/L (ref 136–145)
TIBC SERPL-MCNC: 420 MCG/DL (ref 298–536)
TRANSFERRIN SERPL-MCNC: 282 MG/DL (ref 200–360)
TRIGL SERPL-MCNC: 293 MG/DL (ref 0–150)
VLDLC SERPL-MCNC: 52 MG/DL (ref 5–40)
WBC # BLD AUTO: 9.29 10*3/MM3 (ref 3.4–10.8)

## 2021-01-20 PROCEDURE — 80053 COMPREHEN METABOLIC PANEL: CPT | Performed by: INTERNAL MEDICINE

## 2021-01-20 PROCEDURE — 83880 ASSAY OF NATRIURETIC PEPTIDE: CPT | Performed by: INTERNAL MEDICINE

## 2021-01-20 PROCEDURE — 80061 LIPID PANEL: CPT | Performed by: INTERNAL MEDICINE

## 2021-01-20 PROCEDURE — 85025 COMPLETE CBC W/AUTO DIFF WBC: CPT | Performed by: INTERNAL MEDICINE

## 2021-01-20 PROCEDURE — 36415 COLL VENOUS BLD VENIPUNCTURE: CPT

## 2021-01-20 PROCEDURE — 84466 ASSAY OF TRANSFERRIN: CPT | Performed by: INTERNAL MEDICINE

## 2021-01-20 PROCEDURE — 82728 ASSAY OF FERRITIN: CPT | Performed by: INTERNAL MEDICINE

## 2021-01-20 PROCEDURE — 99214 OFFICE O/P EST MOD 30 MIN: CPT | Performed by: INTERNAL MEDICINE

## 2021-01-20 PROCEDURE — 93000 ELECTROCARDIOGRAM COMPLETE: CPT | Performed by: INTERNAL MEDICINE

## 2021-01-20 PROCEDURE — 83540 ASSAY OF IRON: CPT | Performed by: INTERNAL MEDICINE

## 2021-01-20 RX ORDER — GLYBURIDE 5 MG/1
5 TABLET ORAL
COMMUNITY

## 2021-01-20 RX ORDER — LUBIPROSTONE 24 UG/1
24 CAPSULE ORAL 2 TIMES DAILY WITH MEALS
COMMUNITY

## 2021-01-20 RX ORDER — DULOXETIN HYDROCHLORIDE 30 MG/1
30 CAPSULE, DELAYED RELEASE ORAL DAILY
COMMUNITY

## 2021-01-20 NOTE — PROGRESS NOTES
PATIENTINFORMATION    Date of Office Visit: 2021  Encounter Provider: Danilo Cespedes MD  Place of Service: [facility] CARDIOLOGY  Patient Name: Kenneth Kruse  : 1965    Subjective:     Encounter Date:2021      Patient ID: Kenneth Kruse is a 56 y.o. male.    Chief Complaint   Patient presents with   • Shortness of Breath     6 months follow up    • Hypertension     HPI  Mr. Kruse is 56 years old man with past medical history significant for hypertension, diabetes mellitus, chronic degenerative back disease with leg pain and weakness status post multiple back surgeries with the last one about a month ago came to cardiology clinic for follow-up visit.  I saw him in 2019 for evaluation of exertional shortness of breath and at that time stress echo was ordered that he did not do.  Even after that he back surgery without any significant cardiovascular complications.  Patient reports progressively worsening and continued exertional shortness of breath and also getting retrosternal chest discomfort and he could barely walk 40-50 feet without stopping because of shortness of breath/chest aching.  Another factor for limited mobility is also chronic low back and leg pain and weakness.  He also reports some intermittent palpitations that come during exertion.  He denied any orthopnea, PND but has mild bilateral lower extremity swelling.  He reports his blood pressure being mostly normal his blood sugar being uncontrolled.  He has history of intolerance to different types of statin because of muscle enzyme elevation per patient description.      ROS   All systems reviewed and negative except as noted in HPI.    Past Medical History:   Diagnosis Date   • Anemia    • Anxiety    • Degenerative joint disease (DJD) of lumbar spine    • Depression    • Diabetes mellitus (CMS/HCC)    • POLLOCK (dyspnea on exertion)    • Dyslipidemia    • Edema of extremities    •  "Hyperlipidemia    • Hypertension    • Insomnia    • Polyneuropathy    • Seizures (CMS/HCC)        Past Surgical History:   Procedure Laterality Date   • BACK SURGERY         Social History     Socioeconomic History   • Marital status:      Spouse name: Not on file   • Number of children: Not on file   • Years of education: Not on file   • Highest education level: Not on file   Tobacco Use   • Smoking status: Never Smoker   • Smokeless tobacco: Never Used   Substance and Sexual Activity   • Alcohol use: No   • Drug use: Not Currently   • Sexual activity: Not Currently       Family History   Problem Relation Age of Onset   • Coronary artery disease Father            ECG 12 Lead    Date/Time: 1/20/2021 3:05 PM  Performed by: Danilo Cespedes MD  Authorized by: Danilo Cespedes MD   Comparison: compared with previous ECG from 12/18/2019  Similar to previous ECG  Rhythm: sinus tachycardia  Rate: normal  Conduction: conduction normal  Q waves: III and aVF    ST Segments: ST segments normal  T Waves: T waves normal  QRS axis: normal  Other: no other findings    Clinical impression: abnormal EKG               Objective:     /90   Pulse 103   Ht 177.8 cm (70\")   Wt 117 kg (257 lb)   SpO2 98%   BMI 36.88 kg/m²  Body mass index is 36.88 kg/m².     Constitutional:       General: Not in acute distress.     Appearance: Well-developed. Morbidly obese. Not diaphoretic.      Comments: Patient walks using a cane   Eyes:      Pupils: Pupils are equal, round, and reactive to light.   HENT:      Head: Normocephalic and atraumatic.   Neck:      Musculoskeletal: Normal range of motion and neck supple.      Thyroid: No thyromegaly.   Pulmonary:      Effort: Pulmonary effort is normal. No respiratory distress.      Breath sounds: Normal breath sounds. No wheezing. No rales.   Chest:      Chest wall: Not tender to palpatation.   Cardiovascular:      Tachycardia present. Regular rhythm.      No gallop.   Pulses:    "  Intact distal pulses.   Edema:     Pretibial: bilateral 1+ edema of the pretibial area.     Ankle: bilateral 1+ edema of the ankle.  Abdominal:      General: Bowel sounds are normal. There is no distension.      Palpations: Abdomen is soft.      Tenderness: There is no guarding.   Musculoskeletal: Normal range of motion.         General: No deformity.   Skin:     General: Skin is warm and dry.      Findings: No rash.   Neurological:      Mental Status: Alert and oriented to person, place, and time.      Cranial Nerves: No cranial nerve deficit.      Deep Tendon Reflexes: Reflexes are normal and symmetric.   Psychiatric:         Judgment: Judgment normal.         Review Of Data: I have reviewed labs since prior visit.      Assessment/Plan:         1. Exertional shortness of breath and chest discomfort  2.  Hypertension-fairly controlled on lisinopril  3.  Hyperlipidemia with history of statin intolerance  4.  Type 2 diabetes mellitus-uncontrolled per patient report  5.  Chronic degenerative back pain and multiple surgeries with some bilateral lower extremity pain and weakness and uses a cane to get around  6.  Obesity  7.  History of obstructive sleep apnea with intolerance to CPAP and currently not wearing one.    Patient had normal myocardial perfusion study and echocardiogram back in April 2018 but reports significant worsening of symptoms since then.  I will go ahead and get pharmacological myocardial perfusion study with echocardiogram.  Check basic blood work including hemoglobin, kidney function, lipid panel.  Check JUAN CARLOS as he also complains exertional coughing type pain.      Diagnosis and plan of care discussed with patient and verbalized understanding.           Danilo Cespedes MD  01/20/21  15:07 EST

## 2021-01-20 NOTE — PROGRESS NOTES
Please notify patient that his cholesterol levels are high as he already expects and I will get back to him once the stress test and echocardiogram are complete.    Thank you

## 2021-01-26 ENCOUNTER — TELEPHONE (OUTPATIENT)
Dept: CARDIOLOGY | Facility: CLINIC | Age: 56
End: 2021-01-26

## 2021-01-26 NOTE — TELEPHONE ENCOUNTER
----- Message from Shantal Storey RN sent at 1/22/2021 12:41 PM EST -----  Attempted to contact patient. Voicemail left requesting a call back for the results. Will continue to try and reach patient.      Shantal Storey RN  Triage [facility]

## 2021-01-29 ENCOUNTER — TRANSCRIBE ORDERS (OUTPATIENT)
Dept: SLEEP MEDICINE | Facility: HOSPITAL | Age: 56
End: 2021-01-29

## 2021-01-29 DIAGNOSIS — Z01.818 OTHER SPECIFIED PRE-OPERATIVE EXAMINATION: Primary | ICD-10-CM

## 2021-02-02 ENCOUNTER — LAB (OUTPATIENT)
Dept: LAB | Facility: HOSPITAL | Age: 56
End: 2021-02-02

## 2021-02-02 DIAGNOSIS — Z01.818 OTHER SPECIFIED PRE-OPERATIVE EXAMINATION: ICD-10-CM

## 2021-02-02 PROCEDURE — U0004 COV-19 TEST NON-CDC HGH THRU: HCPCS

## 2021-02-02 PROCEDURE — C9803 HOPD COVID-19 SPEC COLLECT: HCPCS

## 2021-02-03 LAB — SARS-COV-2 RNA RESP QL NAA+PROBE: NOT DETECTED

## 2021-02-04 ENCOUNTER — HOSPITAL ENCOUNTER (OUTPATIENT)
Dept: CARDIOLOGY | Facility: HOSPITAL | Age: 56
Discharge: HOME | End: 2021-02-04

## 2021-02-04 VITALS
WEIGHT: 257 LBS | BODY MASS INDEX: 36.79 KG/M2 | HEIGHT: 70 IN | DIASTOLIC BLOOD PRESSURE: 88 MMHG | SYSTOLIC BLOOD PRESSURE: 148 MMHG

## 2021-02-04 VITALS — BODY MASS INDEX: 36.93 KG/M2 | HEIGHT: 70 IN | WEIGHT: 257.94 LBS

## 2021-02-04 DIAGNOSIS — I73.9 PVD (PERIPHERAL VASCULAR DISEASE) WITH CLAUDICATION (HCC): ICD-10-CM

## 2021-02-04 DIAGNOSIS — R06.02 EXERTIONAL SHORTNESS OF BREATH: ICD-10-CM

## 2021-02-04 LAB
ASCENDING AORTA: 3.5 CM
BH CV ECHO MEAS - ACS: 2.1 CM
BH CV ECHO MEAS - AO MAX PG (FULL): 5.6 MMHG
BH CV ECHO MEAS - AO MAX PG: 9.4 MMHG
BH CV ECHO MEAS - AO MEAN PG (FULL): 2 MMHG
BH CV ECHO MEAS - AO MEAN PG: 4 MMHG
BH CV ECHO MEAS - AO ROOT AREA (BSA CORRECTED): 1.6
BH CV ECHO MEAS - AO ROOT AREA: 10.8 CM^2
BH CV ECHO MEAS - AO ROOT DIAM: 3.7 CM
BH CV ECHO MEAS - AO V2 MAX: 153 CM/SEC
BH CV ECHO MEAS - AO V2 MEAN: 91.9 CM/SEC
BH CV ECHO MEAS - AO V2 VTI: 25.5 CM
BH CV ECHO MEAS - ASC AORTA: 3.5 CM
BH CV ECHO MEAS - AVA(I,A): 3.1 CM^2
BH CV ECHO MEAS - AVA(I,D): 3.1 CM^2
BH CV ECHO MEAS - AVA(V,A): 2.2 CM^2
BH CV ECHO MEAS - AVA(V,D): 2.2 CM^2
BH CV ECHO MEAS - BSA(HAYCOCK): 2.4 M^2
BH CV ECHO MEAS - BSA: 2.3 M^2
BH CV ECHO MEAS - BZI_BMI: 36.9 KILOGRAMS/M^2
BH CV ECHO MEAS - BZI_METRIC_HEIGHT: 177.8 CM
BH CV ECHO MEAS - BZI_METRIC_WEIGHT: 116.6 KG
BH CV ECHO MEAS - EDV(CUBED): 110.6 ML
BH CV ECHO MEAS - EDV(MOD-SP4): 72 ML
BH CV ECHO MEAS - EDV(TEICH): 107.5 ML
BH CV ECHO MEAS - EF(CUBED): 70.4 %
BH CV ECHO MEAS - EF(MOD-BP): 57 %
BH CV ECHO MEAS - EF(MOD-SP4): 56.9 %
BH CV ECHO MEAS - EF(TEICH): 61.9 %
BH CV ECHO MEAS - ESV(CUBED): 32.8 ML
BH CV ECHO MEAS - ESV(MOD-SP4): 31 ML
BH CV ECHO MEAS - ESV(TEICH): 41 ML
BH CV ECHO MEAS - FS: 33.3 %
BH CV ECHO MEAS - IVS/LVPW: 1
BH CV ECHO MEAS - IVSD: 1 CM
BH CV ECHO MEAS - LAT PEAK E' VEL: 11.2 CM/SEC
BH CV ECHO MEAS - LV DIASTOLIC VOL/BSA (35-75): 31 ML/M^2
BH CV ECHO MEAS - LV MASS(C)D: 170.2 GRAMS
BH CV ECHO MEAS - LV MASS(C)DI: 73.3 GRAMS/M^2
BH CV ECHO MEAS - LV MAX PG: 3.7 MMHG
BH CV ECHO MEAS - LV MEAN PG: 2 MMHG
BH CV ECHO MEAS - LV SYSTOLIC VOL/BSA (12-30): 13.4 ML/M^2
BH CV ECHO MEAS - LV V1 MAX: 96.8 CM/SEC
BH CV ECHO MEAS - LV V1 MEAN: 57.8 CM/SEC
BH CV ECHO MEAS - LV V1 VTI: 22.5 CM
BH CV ECHO MEAS - LVIDD: 4.8 CM
BH CV ECHO MEAS - LVIDS: 3.2 CM
BH CV ECHO MEAS - LVLD AP4: 9 CM
BH CV ECHO MEAS - LVLS AP4: 7.1 CM
BH CV ECHO MEAS - LVOT AREA (M): 3.5 CM^2
BH CV ECHO MEAS - LVOT AREA: 3.5 CM^2
BH CV ECHO MEAS - LVOT DIAM: 2.1 CM
BH CV ECHO MEAS - LVPWD: 1 CM
BH CV ECHO MEAS - MED PEAK E' VEL: 9.4 CM/SEC
BH CV ECHO MEAS - MV A DUR: 0.13 SEC
BH CV ECHO MEAS - MV A MAX VEL: 95.1 CM/SEC
BH CV ECHO MEAS - MV DEC SLOPE: 255 CM/SEC^2
BH CV ECHO MEAS - MV DEC TIME: 0.29 SEC
BH CV ECHO MEAS - MV E MAX VEL: 70.8 CM/SEC
BH CV ECHO MEAS - MV E/A: 0.74
BH CV ECHO MEAS - MV MAX PG: 5.3 MMHG
BH CV ECHO MEAS - MV MEAN PG: 2 MMHG
BH CV ECHO MEAS - MV P1/2T MAX VEL: 72.8 CM/SEC
BH CV ECHO MEAS - MV P1/2T: 83.6 MSEC
BH CV ECHO MEAS - MV V2 MAX: 115 CM/SEC
BH CV ECHO MEAS - MV V2 MEAN: 70.9 CM/SEC
BH CV ECHO MEAS - MV V2 VTI: 20.1 CM
BH CV ECHO MEAS - MVA P1/2T LCG: 3 CM^2
BH CV ECHO MEAS - MVA(P1/2T): 2.6 CM^2
BH CV ECHO MEAS - MVA(VTI): 3.9 CM^2
BH CV ECHO MEAS - PA MAX PG (FULL): 2.9 MMHG
BH CV ECHO MEAS - PA MAX PG: 5.1 MMHG
BH CV ECHO MEAS - PA V2 MAX: 113 CM/SEC
BH CV ECHO MEAS - PULM A REVS DUR: 0.11 SEC
BH CV ECHO MEAS - PULM A REVS VEL: 27.9 CM/SEC
BH CV ECHO MEAS - PULM DIAS VEL: 30.6 CM/SEC
BH CV ECHO MEAS - PULM S/D: 1.4
BH CV ECHO MEAS - PULM SYS VEL: 43.4 CM/SEC
BH CV ECHO MEAS - PVA(V,A): 3.8 CM^2
BH CV ECHO MEAS - PVA(V,D): 3.8 CM^2
BH CV ECHO MEAS - QP/QS: 0.72
BH CV ECHO MEAS - RV MAX PG: 2.3 MMHG
BH CV ECHO MEAS - RV MEAN PG: 1 MMHG
BH CV ECHO MEAS - RV V1 MAX: 75.1 CM/SEC
BH CV ECHO MEAS - RV V1 MEAN: 49.1 CM/SEC
BH CV ECHO MEAS - RV V1 VTI: 9.8 CM
BH CV ECHO MEAS - RVOT AREA: 5.7 CM^2
BH CV ECHO MEAS - RVOT DIAM: 2.7 CM
BH CV ECHO MEAS - SI(AO): 118.1 ML/M^2
BH CV ECHO MEAS - SI(CUBED): 33.5 ML/M^2
BH CV ECHO MEAS - SI(LVOT): 33.6 ML/M^2
BH CV ECHO MEAS - SI(MOD-SP4): 17.7 ML/M^2
BH CV ECHO MEAS - SI(TEICH): 28.7 ML/M^2
BH CV ECHO MEAS - SV(AO): 274.2 ML
BH CV ECHO MEAS - SV(CUBED): 77.8 ML
BH CV ECHO MEAS - SV(LVOT): 77.9 ML
BH CV ECHO MEAS - SV(MOD-SP4): 41 ML
BH CV ECHO MEAS - SV(RVOT): 56.3 ML
BH CV ECHO MEAS - SV(TEICH): 66.6 ML
BH CV ECHO MEAS - TAPSE (>1.6): 2.3 CM
BH CV ECHO MEASUREMENTS AVERAGE E/E' RATIO: 6.87
BH CV LOWER ARTERIAL LEFT ABI RATIO: 1.12
BH CV LOWER ARTERIAL LEFT CALF RATIO: 1.06
BH CV LOWER ARTERIAL LEFT HIGH THIGH SYS MAX: 202 MMHG
BH CV LOWER ARTERIAL LEFT LOW THIGH SYS MAX: 174 MMHG
BH CV LOWER ARTERIAL LEFT POPLITEAL SYS MAX: 153 MMHG
BH CV LOWER ARTERIAL LEFT POST TIBIAL SYS MAX: 162 MMHG
BH CV LOWER ARTERIAL RIGHT ABI RATIO: 1.3
BH CV LOWER ARTERIAL RIGHT CALF RATIO: 1.05
BH CV LOWER ARTERIAL RIGHT HIGH THIGH SYS MAX: 208 MMHG
BH CV LOWER ARTERIAL RIGHT LOW THIGH SYS MAX: 175 MMHG
BH CV LOWER ARTERIAL RIGHT POPLITEAL SYS MAX: 152 MMHG
BH CV LOWER ARTERIAL RIGHT POST TIBIAL SYS MAX: 182 MMHG
BH CV NUCLEAR PRIOR STUDY: 3
BH CV STRESS BP STAGE 1: NORMAL
BH CV STRESS COMMENTS STAGE 1: NORMAL
BH CV STRESS DOSE REGADENOSON STAGE 1: 0.4
BH CV STRESS DURATION MIN STAGE 1: 0
BH CV STRESS DURATION SEC STAGE 1: 10
BH CV STRESS HR STAGE 1: 106
BH CV STRESS PROTOCOL 1: NORMAL
BH CV STRESS RECOVERY BP: NORMAL MMHG
BH CV STRESS RECOVERY HR: 103 BPM
BH CV STRESS STAGE 1: 1
BH CV XLRA - RV BASE: 3.1 CM
BH CV XLRA - RV LENGTH: 7.8 CM
BH CV XLRA - RV MID: 2.7 CM
BH CV XLRA - TDI S': 10.9 CM/SEC
LV EF NUC BP: 72 %
MAXIMAL PREDICTED HEART RATE: 164 BPM
MAXIMAL PREDICTED HEART RATE: 164 BPM
PERCENT MAX PREDICTED HR: 64.63 %
SINUS: 3.1 CM
STJ: 3.2 CM
STRESS BASELINE BP: NORMAL MMHG
STRESS BASELINE HR: 104 BPM
STRESS PERCENT HR: 76 %
STRESS POST EXERCISE DUR SEC: 10 SEC
STRESS POST PEAK BP: NORMAL MMHG
STRESS POST PEAK HR: 106 BPM
STRESS TARGET HR: 139 BPM
STRESS TARGET HR: 139 BPM
UPPER ARTERIAL LEFT ARM BRACHIAL SYS MAX: 145 MMHG
UPPER ARTERIAL RIGHT ARM BRACHIAL SYS MAX: 140 MMHG

## 2021-02-04 PROCEDURE — 78492 MYOCRD IMG PET MLT RST&STRS: CPT | Performed by: INTERNAL MEDICINE

## 2021-02-04 PROCEDURE — 93017 CV STRESS TEST TRACING ONLY: CPT

## 2021-02-04 PROCEDURE — 93923 UPR/LXTR ART STDY 3+ LVLS: CPT | Performed by: INTERNAL MEDICINE

## 2021-02-04 PROCEDURE — 78492 MYOCRD IMG PET MLT RST&STRS: CPT

## 2021-02-04 PROCEDURE — 25010000002 REGADENOSON 0.4 MG/5ML SOLUTION: Performed by: INTERNAL MEDICINE

## 2021-02-04 PROCEDURE — 93016 CV STRESS TEST SUPVJ ONLY: CPT | Performed by: INTERNAL MEDICINE

## 2021-02-04 PROCEDURE — 93018 CV STRESS TEST I&R ONLY: CPT | Performed by: INTERNAL MEDICINE

## 2021-02-04 PROCEDURE — 93306 TTE W/DOPPLER COMPLETE: CPT | Performed by: INTERNAL MEDICINE

## 2021-02-04 PROCEDURE — 93306 TTE W/DOPPLER COMPLETE: CPT

## 2021-02-04 PROCEDURE — A9555 RB82 RUBIDIUM: HCPCS | Performed by: INTERNAL MEDICINE

## 2021-02-04 PROCEDURE — 0 RUBIDIUM CHLORIDE: Performed by: INTERNAL MEDICINE

## 2021-02-04 PROCEDURE — 93923 UPR/LXTR ART STDY 3+ LVLS: CPT

## 2021-02-04 RX ADMIN — REGADENOSON 0.4 MG: 0.08 INJECTION, SOLUTION INTRAVENOUS at 13:19

## 2021-02-04 NOTE — PROGRESS NOTES
Can you please notify this patient that stress test is normal and schedule a 3 month fu visit?    Thank you

## 2021-02-05 ENCOUNTER — TELEPHONE (OUTPATIENT)
Dept: CARDIOLOGY | Facility: CLINIC | Age: 56
End: 2021-02-05

## 2021-02-05 RX ORDER — ATORVASTATIN CALCIUM 40 MG/1
40 TABLET, FILM COATED ORAL DAILY
Qty: 90 TABLET | Refills: 3 | Status: SHIPPED | OUTPATIENT
Start: 2021-02-05 | End: 2021-02-05

## 2021-02-05 RX ORDER — EZETIMIBE 10 MG/1
10 TABLET ORAL DAILY
Qty: 30 TABLET | Refills: 3 | Status: SHIPPED | OUTPATIENT
Start: 2021-02-05 | End: 2021-10-04

## 2021-02-05 NOTE — PROGRESS NOTES
I called and discussed results of stress test and echocardiogram with this patient.  I will try him on Zetia and encouraged him to walk regularly.  With continued symptoms may need to do coronary angiogram.  I will see him in 1 month.

## 2021-09-08 ENCOUNTER — OFFICE VISIT (OUTPATIENT)
Dept: CARDIOLOGY | Facility: CLINIC | Age: 56
End: 2021-09-08

## 2021-09-08 VITALS
DIASTOLIC BLOOD PRESSURE: 80 MMHG | BODY MASS INDEX: 34.07 KG/M2 | HEIGHT: 70 IN | WEIGHT: 238 LBS | HEART RATE: 101 BPM | OXYGEN SATURATION: 9 % | SYSTOLIC BLOOD PRESSURE: 128 MMHG

## 2021-09-08 DIAGNOSIS — R06.09 DYSPNEA ON EXERTION: Primary | ICD-10-CM

## 2021-09-08 DIAGNOSIS — I10 ESSENTIAL HYPERTENSION: ICD-10-CM

## 2021-09-08 PROCEDURE — 99214 OFFICE O/P EST MOD 30 MIN: CPT | Performed by: INTERNAL MEDICINE

## 2021-09-08 PROCEDURE — 93000 ELECTROCARDIOGRAM COMPLETE: CPT | Performed by: INTERNAL MEDICINE

## 2021-09-08 NOTE — PROGRESS NOTES
OPERATIVE REPORT:      PREOPERATIVE DIAGNOSIS:    Acute appendicitis    POSTOPERATIVE DIAGNOSIS:    Same    PROCEDURE PERFORMED:    Laparoscopic appendectomy    SURGEON:    Jimmy Esquivel D.O.    ASSISTANT:    None    ANESTHESIA:    General    ANESTHESIOLOGIST:    Anesthesiologist: Alexy Chris MD    ESTIMATED BLOOD LOSS:    Minimal    DRAINS:    None    SPECIMENS:    Appendix    IMPLANTS:    None    COMPLICATIONS:    None    DISPOSITION:    PACU hemodynamically stable    FINDINGS:   Entered the abdomen without any issues. The appendix was identified, no perforation. Appendix was removed without any issues.    INDICATIONS FOR PROCEDURE:    Please see consult.     NARRATIVE:    After informed consent was obtained and signed, the patient was taken back to the operating room and placed in supine position. WHO protocol was initiated and completed. After adequate general anesthesia was induced, the patient's left arm was tucked at their side. Abdomen was prepped and draped in sterile manner. A supraumbilical incision was made through which an 5 mm Optiview trocar and camera were inserted into the abdomen. This was done without any complications. Abdomen was inspected. No anatomic abnormalities were found. Upon examining the right lower quadrant, there was no purulent fluid no gross stool, there was some turbid fluid within the pelvis. At this point, the patient was placed in Trendelenburg position and air planed to their left. A 5 mm Optiview trocar was inserted suprapubically and a 8 mm trocar inserted in the left lower quadrant under direct visualization.    Using bowel graspers, we identified the appendix. It was inflamed and thick but not perforated. The mesoappendix was identified and ligated with LigaSure. Base of the appendix identified and Endoloops placed; 2 proximal and one distal, cut in-between leaving 2 Endoloops at the appendiceal stump. Appendix was then placed into an Endo Catch and removed the appendix  PATIENTINFORMATION    Date of Office Visit: 2021  Encounter Provider: Danilo Cespedes MD  Place of Service: [facility] CARDIOLOGY  Patient Name: Kenneth Kruse  : 1965    Subjective:     Encounter Date:2021      Patient ID: Kenneth Kruse is a 56 y.o. male.    Chief Complaint   Patient presents with   • Hypertension     8 months    • Exertional shortness of breath     HPI  Mr. Kruse is a 56 years old man with past medical history of exertional shortness of breath, multiple back surgeries and bilateral lower extremity weakness, hypertension, hyperlipidemia and type diabetes mellitus came to cardiology clinic for follow-up visit.  He had a myocardial perfusion study and echocardiogram since last visit that were unremarkable but he continues to report exertional shortness of breath but denies any significant chest pain, orthopnea, PND, palpitations, presyncope syncope or significant lower extremity swelling.  He has noted varicose veins on the left lower calf that is not new but denied any recent changes.  He is compliant with his current medications and he admits his blood sugar is still running high and uncontrolled.  He admits he does not exercise regularly because he gets easily short winded.  He uses cane on both sides to get around and denied any significant falls.  No ER visits or hospitalizations since last clinic visit.      ROS   All systems reviewed and negative except as noted in HPI.    Past Medical History:   Diagnosis Date   • Anemia    • Anxiety    • Degenerative joint disease (DJD) of lumbar spine    • Depression    • Diabetes mellitus (CMS/HCC)    • POLLOCK (dyspnea on exertion)    • Dyslipidemia    • Edema of extremities    • Hyperlipidemia    • Hypertension    • Insomnia    • Polyneuropathy    • Seizures (CMS/HCC)        Past Surgical History:   Procedure Laterality Date   • BACK SURGERY         Social History     Socioeconomic History   • Marital  "status:      Spouse name: Not on file   • Number of children: Not on file   • Years of education: Not on file   • Highest education level: Not on file   Tobacco Use   • Smoking status: Never Smoker   • Smokeless tobacco: Never Used   Substance and Sexual Activity   • Alcohol use: No   • Drug use: Not Currently   • Sexual activity: Not Currently       Family History   Problem Relation Age of Onset   • Coronary artery disease Father            ECG 12 Lead    Date/Time: 9/8/2021 12:27 PM  Performed by: Danilo Cespedes MD  Authorized by: Danilo Cespedes MD   Comparison: compared with previous ECG from 1/20/2021  Similar to previous ECG  Rhythm: sinus tachycardia  Rate: normal  Conduction: conduction normal  ST Segments: ST segments normal  T Waves: T waves normal  QRS axis: normal  Other: no other findings    Clinical impression: abnormal EKG               Objective:     /80   Pulse 101   Ht 177.8 cm (70\")   Wt 108 kg (238 lb)   SpO2 (!) 9%   BMI 34.15 kg/m²  Body mass index is 34.15 kg/m².     Constitutional:       General: Not in acute distress.     Appearance: Well-developed. Not diaphoretic.   Eyes:      Pupils: Pupils are equal, round, and reactive to light.   HENT:      Head: Normocephalic and atraumatic.   Neck:      Thyroid: No thyromegaly.   Pulmonary:      Effort: Pulmonary effort is normal. No respiratory distress.      Breath sounds: Normal breath sounds. No wheezing. No rales.   Chest:      Chest wall: Not tender to palpatation.   Cardiovascular:      Tachycardia present. Regular rhythm.      No gallop.   Pulses:     Intact distal pulses.   Edema:     Peripheral edema absent.   Abdominal:      General: Bowel sounds are normal. There is no distension.      Palpations: Abdomen is soft.      Tenderness: There is no guarding.   Musculoskeletal: Normal range of motion.         General: No deformity.      Cervical back: Normal range of motion and neck supple. Skin:     General: Skin " from the abdomen. Passed the appendix off the field and sent it to pathology. Once this was completed, we copiously irrigated and suctioned out the abdomen, checked again for hemostasis, then flattened the patient out. We removed all ports under direct visualization. All port sites greater than 8 mm of the fascia was closed with interrupted 0 Vicryl. Skin was reapproximated with 4-0 Monocryl. All port sites were injected with 0.25% Marcaine with epinephrine. Patient tolerated the procedure well. At the end of the case, all instrument counts were correct. Patient was transferred to Post Anesthesia Care Unit in stable condition.   is warm and dry.      Findings: No rash.   Neurological:      Mental Status: Alert and oriented to person, place, and time.      Cranial Nerves: No cranial nerve deficit.      Deep Tendon Reflexes: Reflexes are normal and symmetric.      Comments: Bilateral lower extremity weakness and unsteady gait   Psychiatric:         Judgment: Judgment normal.         Review Of Data:       Assessment/Plan:       1. Exertional shortness of breath/mild resting sinus tachycardia  -Patient had unremarkable myocardial perfusion study and echocardiogram in February 2021, also similar study in 2018  -I suspect symptoms are originating from deconditioning/not walking or exercising regularly/and obesity-I have discussed with him importance of  walking regularly/exercising by walking around stationary bike with gradual build up and with a goal of walking 30-45 minutes most days of the week.  He is agreeable to that plan and also willing to modify diet to lose weight.  2.  Hypertension-fairly controlled on lisinopril  3.  Hyperlipidemia with history of statin intolerance  4.  Type 2 diabetes mellitus-still uncontrolled and working with PCP  5.  Chronic degenerative back pain and multiple surgeries with some bilateral lower extremity pain and weakness and uses a cane to get around-no recent falls  6.  Obesity-counseled  7.  History of obstructive sleep apnea with intolerance to CPAP and currently not wearing one.      Return to clinic in 6 months  Diagnosis and plan of care discussed with patient and verbalized understanding.           Danilo Cespedes MD  09/08/21  12:45 EDT

## 2021-10-04 RX ORDER — EZETIMIBE 10 MG/1
TABLET ORAL
Qty: 90 TABLET | Refills: 3 | Status: SHIPPED | OUTPATIENT
Start: 2021-10-04

## 2022-04-11 NOTE — TELEPHONE ENCOUNTER
Last OV 9/8/21.  NO SHOW last appt.  No future appt.  Please advise. [facility]

## 2022-04-12 RX ORDER — ATORVASTATIN CALCIUM 40 MG/1
TABLET, FILM COATED ORAL
Qty: 90 TABLET | Refills: 0 | Status: SHIPPED | OUTPATIENT
Start: 2022-04-12 | End: 2022-07-06

## 2022-07-06 RX ORDER — ATORVASTATIN CALCIUM 40 MG/1
TABLET, FILM COATED ORAL
Qty: 90 TABLET | Refills: 0 | Status: SHIPPED | OUTPATIENT
Start: 2022-07-06 | End: 2022-12-05

## 2022-12-05 RX ORDER — ATORVASTATIN CALCIUM 40 MG/1
TABLET, FILM COATED ORAL
Qty: 90 TABLET | Refills: 0 | Status: SHIPPED | OUTPATIENT
Start: 2022-12-05

## 2023-02-01 ENCOUNTER — TELEPHONE (OUTPATIENT)
Dept: NEUROLOGY | Facility: CLINIC | Age: 58
End: 2023-02-01

## 2023-02-01 NOTE — TELEPHONE ENCOUNTER
Caller: YOVANA  Relationship: DR MALDONADO  Best call back number: [phone]  EXT 13    Who are you requesting to speak with (clinical staff, provider,  specific staff member):   DR DALY    What was the call regarding:   PT HAS BEEN SCHEDULED FOR NEW PT APPT ON 3-13-23 AND IS ON THE WAIT LIST FOR SOONER APPT.    PT HAS HAVING SEIZURES AND DR MALDONADO IS CALLING TO SEE IF PT CAN BE SEEN SOONER.    Do you require a callback:   YES IF SOONER APPT IS AVAILABLE.  PT'S PHONE NUMBER [phone].

## 2023-02-06 ENCOUNTER — TELEPHONE (OUTPATIENT)
Dept: NEUROLOGY | Facility: CLINIC | Age: 58
End: 2023-02-06
Payer: MEDICARE

## 2023-02-23 ENCOUNTER — TELEPHONE (OUTPATIENT)
Dept: NEUROLOGY | Facility: CLINIC | Age: 58
End: 2023-02-23
Payer: MEDICARE

## 2023-02-23 NOTE — TELEPHONE ENCOUNTER
Patient and wife called and were trying to reschedule a NP appt. For Dr. Kowalski.    They are using  and are sending all the information via fax.  We will verify and call them back to schedule.

## 2025-06-18 ENCOUNTER — TELEPHONE (OUTPATIENT)
Dept: GASTROENTEROLOGY | Facility: CLINIC | Age: 60
End: 2025-06-18